# Patient Record
Sex: MALE | Race: OTHER | HISPANIC OR LATINO | URBAN - METROPOLITAN AREA
[De-identification: names, ages, dates, MRNs, and addresses within clinical notes are randomized per-mention and may not be internally consistent; named-entity substitution may affect disease eponyms.]

---

## 2018-04-25 ENCOUNTER — EMERGENCY (EMERGENCY)
Facility: HOSPITAL | Age: 38
LOS: 1 days | Discharge: HOME | End: 2018-04-25
Attending: EMERGENCY MEDICINE
Payer: MEDICAID

## 2018-04-25 VITALS
SYSTOLIC BLOOD PRESSURE: 122 MMHG | TEMPERATURE: 98 F | HEART RATE: 78 BPM | RESPIRATION RATE: 20 BRPM | DIASTOLIC BLOOD PRESSURE: 74 MMHG | OXYGEN SATURATION: 100 %

## 2018-04-25 VITALS
RESPIRATION RATE: 18 BRPM | TEMPERATURE: 98 F | OXYGEN SATURATION: 99 % | DIASTOLIC BLOOD PRESSURE: 62 MMHG | SYSTOLIC BLOOD PRESSURE: 105 MMHG | HEART RATE: 55 BPM

## 2018-04-25 PROCEDURE — 99284 EMERGENCY DEPT VISIT MOD MDM: CPT | Mod: 25

## 2018-04-25 PROCEDURE — 96375 TX/PRO/DX INJ NEW DRUG ADDON: CPT

## 2018-04-25 PROCEDURE — 96374 THER/PROPH/DIAG INJ IV PUSH: CPT

## 2018-04-25 RX ORDER — FAMOTIDINE 10 MG/ML
1 INJECTION INTRAVENOUS
Qty: 10 | Refills: 0 | OUTPATIENT
Start: 2018-04-25 | End: 2018-05-04

## 2018-04-25 RX ORDER — DIPHENHYDRAMINE HCL 50 MG
25 CAPSULE ORAL ONCE
Qty: 0 | Refills: 0 | Status: COMPLETED | OUTPATIENT
Start: 2018-04-25 | End: 2018-04-25

## 2018-04-25 RX ORDER — FAMOTIDINE 10 MG/ML
20 INJECTION INTRAVENOUS ONCE
Qty: 0 | Refills: 0 | Status: COMPLETED | OUTPATIENT
Start: 2018-04-25 | End: 2018-04-25

## 2018-04-25 RX ORDER — SODIUM CHLORIDE 9 MG/ML
3 INJECTION INTRAMUSCULAR; INTRAVENOUS; SUBCUTANEOUS ONCE
Qty: 0 | Refills: 0 | Status: COMPLETED | OUTPATIENT
Start: 2018-04-25 | End: 2018-04-25

## 2018-04-25 RX ORDER — EPINEPHRINE 0.3 MG/.3ML
1 INJECTION INTRAMUSCULAR; SUBCUTANEOUS
Qty: 1 | Refills: 0 | OUTPATIENT
Start: 2018-04-25

## 2018-04-25 RX ADMIN — Medication 25 MILLIGRAM(S): at 02:08

## 2018-04-25 RX ADMIN — FAMOTIDINE 20 MILLIGRAM(S): 10 INJECTION INTRAVENOUS at 02:08

## 2018-04-25 RX ADMIN — Medication 125 MILLIGRAM(S): at 02:08

## 2018-04-25 RX ADMIN — SODIUM CHLORIDE 3 MILLILITER(S): 9 INJECTION INTRAMUSCULAR; INTRAVENOUS; SUBCUTANEOUS at 02:10

## 2018-04-25 NOTE — ED PROVIDER NOTE - ENMT, MLM
Airway patent, no drooling. Nasal mucosa clear. Mouth with normal mucosa. Throat has no vesicles, no oropharyngeal exudates and uvula is midline. No swelling of posterior pharynx

## 2018-04-25 NOTE — ED PROVIDER NOTE - CONSTITUTIONAL, MLM
normal... Well appearing, well nourished, awake, alert, oriented to person, place, time/situation and in no apparent distress. Pt is speaking in full sentences

## 2018-04-25 NOTE — ED PROVIDER NOTE - OBJECTIVE STATEMENT
37 y/o M presents to the ED after allergic reaction which onset 4 hours ago. Pt states that he ordered a shake at the gym and started experiencing symotoms after drinking the shake, Pt did not eat any almonds but thinks that the shake may have had almonds in it. Pt did not eat or buy anything new besides the milkshake. Pt has difficulty breathing. Pt 39 y/o M presents to the ED after rashes and redness to arms & neck s/p allergic reaction which onset 4 hours ago. Pt states that he ordered a shake at the gym and started experiencing symptoms after drinking the shake. He says he did not eat any almonds but thinks that the shake may have had almonds in it. Pt did not eat or buy anything new besides the shake. He currently has difficulty breathing. No further complaints at this time.

## 2018-04-25 NOTE — ED PROVIDER NOTE - PROGRESS NOTE DETAILS
Pt improved with meds and is stable for d/c.  Rx Prednisone, Pepcid, Benadry and Epi-pen with f/u next 1-2 days

## 2018-04-25 NOTE — ED ADULT NURSE NOTE - OBJECTIVE STATEMENT
Pt a&ox3 c/o allergic rxn, pt reports being exposed to almonds @ his gym @ approx 2200 last night, pt took 25mg benadryl PTA. Pt c/o generalized itchiness and redness to body, redness noted to BUE and BLE, pt reports itchiness in throat and difficulty swallowing, pt placed on tele box, able to talk to RN. Lungs CTA HEATHER, RR even and unlabored,, SAo2 98% on room air, no angioedema noted. MAEx4, #20g iv placed to right ac, medicated per md orders, in no apparent distress @ this time, updated on poc safety maintained

## 2018-09-13 NOTE — ED ADULT NURSE NOTE - NS ED NURSE DC INFO COMPLEXITY
Left message for pt to return call to clinic regarding below information    HM and Surg Hx updated.   Simple: Patient demonstrates quick and easy understanding

## 2021-05-10 ENCOUNTER — EMERGENCY (EMERGENCY)
Facility: HOSPITAL | Age: 41
LOS: 1 days | Discharge: HOME | End: 2021-05-10
Attending: EMERGENCY MEDICINE
Payer: MEDICAID

## 2021-05-10 VITALS
SYSTOLIC BLOOD PRESSURE: 116 MMHG | HEART RATE: 77 BPM | DIASTOLIC BLOOD PRESSURE: 73 MMHG | RESPIRATION RATE: 18 BRPM | WEIGHT: 205.03 LBS | TEMPERATURE: 98 F | OXYGEN SATURATION: 97 % | HEIGHT: 69 IN

## 2021-05-10 PROCEDURE — 99283 EMERGENCY DEPT VISIT LOW MDM: CPT

## 2021-05-10 PROCEDURE — 73590 X-RAY EXAM OF LOWER LEG: CPT | Mod: 26,LT

## 2021-05-10 PROCEDURE — 99283 EMERGENCY DEPT VISIT LOW MDM: CPT | Mod: 25

## 2021-05-10 PROCEDURE — 73590 X-RAY EXAM OF LOWER LEG: CPT

## 2021-05-10 RX ORDER — CEPHALEXIN 500 MG
1 CAPSULE ORAL
Qty: 28 | Refills: 0
Start: 2021-05-10 | End: 2021-05-16

## 2021-05-10 NOTE — ED ADULT TRIAGE NOTE - CHIEF COMPLAINT QUOTE
Pt c/o left lower shin pain starting yesterday, states he can walk but painful to put weight on leg, denies injury/trauma to area but lifted a door the other day

## 2021-05-10 NOTE — ED STATDOCS - PATIENT PORTAL LINK FT
You can access the FollowMyHealth Patient Portal offered by [facility] by registering at the following website: [email or web address]. By joining [employer]’s FollowMyHealth portal, you will also be able to view your health information using other applications (apps) compatible with our system.

## 2021-05-10 NOTE — ED STATDOCS - OBJECTIVE STATEMENT
Pt is a 42 y/o M w/no reported PMHx presents c/o left lower leg pain.  Pt states that last night he began to have sharp 8/10 non-radiating pain over the anterior left lower leg.  He took two tylenol with some relief, but when he woke up this morning the pain had returned with weight bearing and movement.  At rest he states the pain is minimal.  Pt is very active at work, and carries heavy items, but does not recall an inciting event.

## 2021-05-10 NOTE — ED STATDOCS - NS ED ROS FT
CONSTITUTIONAL: No fevers, no chills  Eyes: No vision changes  Cardiovascular: No Chest pain  Respiratory: No SOB  Gastrointestinal: No n/v/c/d, no abd pain  Genitourinary: no dysuria, no hematuria  SKIN: no rashes.  MSK: +left leg pain  NEURO: no headache, no weakness, no numbness  PSYCHIATRIC: no SI/HI

## 2021-05-10 NOTE — ED STATDOCS - PHYSICAL EXAMINATION
General: well appearing, interactive, well nourished, NAD  HEENT: pupils equal and reactive, normal external ears bilaterally   Cardiac: RRR, no MRG appreciated  Resp: lungs clear to auscultation bilaterally, symmetric chest wall rise  Abd: soft, nontender, nondistended,   : no CVA tenderness  Neuro: Moving all extremities  Skin:  normal color for race  MSK: +ttp left lower shin, FROM, no erythema, no warmth, DP intact, no edema

## 2021-05-10 NOTE — ED STATDOCS - ATTENDING CONTRIBUTION TO CARE
Dr. Rodriguez : I have personally seen and examined this patient at the bedside. I have fully participated in the care of this patient. I have reviewed all pertinent clinical information, including history, physical exam, plan and the Resident's note and agree except as noted.     42 y/o M w/no reported PMHx presents c/o left lower leg pain since last night.   sharp 8/10 non-radiating pain over the anterior left lower leg.  He took two tylenol with some relief, but when he woke up this morning the pain had returned with weight bearing and movement.  no pain at rest.denies trauma no new gym routine.     Denies f/c/n/v/cp/sob/palpitations/cough/abd.pain/d/c/dysuria/hematuria.  no sick contacts/recent travel.    PE:  head; atraumatic normocephalic  eyes: perrla  Heart: rrr s1s2  lungs: ctab  abd: soft, nt nd + bs no rebound/guarding no cva ttp  le: no calf swelling no calf ttp mild swelling to anterior shin of left leg no erythema+warmth  no bullae no crepitus neurovascularly intact ambulatory   back: no midline cervical/thoracic/lumbar ttp    -->early cellulitis vs inflammatory c hanges xray abx strict return precautions discussed with pt and wife dc      -->

## 2021-05-10 NOTE — ED STATDOCS - CLINICAL SUMMARY MEDICAL DECISION MAKING FREE TEXT BOX
Pt is a 40 y/o M c/o left shin pain will recommend motrin for pain control, rest, ice, elevation and return precautions

## 2024-08-26 ENCOUNTER — EMERGENCY (EMERGENCY)
Facility: HOSPITAL | Age: 44
LOS: 1 days | Discharge: HOME | End: 2024-08-26
Attending: EMERGENCY MEDICINE
Payer: COMMERCIAL

## 2024-08-26 VITALS
WEIGHT: 195.11 LBS | OXYGEN SATURATION: 95 % | SYSTOLIC BLOOD PRESSURE: 136 MMHG | RESPIRATION RATE: 18 BRPM | TEMPERATURE: 99 F | HEIGHT: 68 IN | DIASTOLIC BLOOD PRESSURE: 89 MMHG | HEART RATE: 57 BPM

## 2024-08-26 PROCEDURE — 99283 EMERGENCY DEPT VISIT LOW MDM: CPT

## 2024-08-26 PROCEDURE — 96372 THER/PROPH/DIAG INJ SC/IM: CPT

## 2024-08-26 PROCEDURE — 99284 EMERGENCY DEPT VISIT MOD MDM: CPT

## 2024-08-26 RX ORDER — METHOCARBAMOL 750 MG/1
2 TABLET, FILM COATED ORAL
Qty: 18 | Refills: 0
Start: 2024-08-26 | End: 2024-08-28

## 2024-08-26 RX ORDER — KETOROLAC TROMETHAMINE 30 MG/ML
15 INJECTION, SOLUTION INTRAMUSCULAR ONCE
Refills: 0 | Status: DISCONTINUED | OUTPATIENT
Start: 2024-08-26 | End: 2024-08-26

## 2024-08-26 RX ORDER — LIDOCAINE/BENZALKONIUM/ALCOHOL
1 SOLUTION, NON-ORAL TOPICAL ONCE
Refills: 0 | Status: COMPLETED | OUTPATIENT
Start: 2024-08-26 | End: 2024-08-26

## 2024-08-26 RX ORDER — IBUPROFEN 600 MG
1 TABLET ORAL
Qty: 28 | Refills: 0
Start: 2024-08-26 | End: 2024-09-01

## 2024-08-26 RX ORDER — LIDOCAINE/BENZALKONIUM/ALCOHOL
1 SOLUTION, NON-ORAL TOPICAL
Qty: 1 | Refills: 0
Start: 2024-08-26 | End: 2024-08-30

## 2024-08-26 RX ORDER — METHOCARBAMOL 750 MG/1
1500 TABLET, FILM COATED ORAL ONCE
Refills: 0 | Status: COMPLETED | OUTPATIENT
Start: 2024-08-26 | End: 2024-08-26

## 2024-08-26 RX ADMIN — KETOROLAC TROMETHAMINE 15 MILLIGRAM(S): 30 INJECTION, SOLUTION INTRAMUSCULAR at 11:54

## 2024-08-26 RX ADMIN — METHOCARBAMOL 1500 MILLIGRAM(S): 750 TABLET, FILM COATED ORAL at 11:54

## 2024-08-26 RX ADMIN — Medication 1 PATCH: at 12:07

## 2024-08-26 RX ADMIN — KETOROLAC TROMETHAMINE 15 MILLIGRAM(S): 30 INJECTION, SOLUTION INTRAMUSCULAR at 13:08

## 2024-08-26 NOTE — ED PROVIDER NOTE - PATIENT PORTAL LINK FT
You can access the FollowMyHealth Patient Portal offered by [facility] by registering at the following website: [email or web address]. By joining [employer]’s FollowMyHealth portal, you will also be able to view your health information using other applications (apps) compatible with our system.

## 2024-08-26 NOTE — ED ADULT NURSE NOTE - OBJECTIVE STATEMENT
Assumed care of patient in rw. Pt a&ox4 rr even and unlabored presents to ed with c/o of lower back pain. Reports playing racket ball last week. Denies urinary symptoms, chest pain, sob, fevers, chills, gi symptoms. pt educated on plan of care, pt able to successfully teach back plan of care to RN, RN will continue to reeducate pt during hospital stay.

## 2024-08-26 NOTE — ED PROVIDER NOTE - OBJECTIVE STATEMENT
44M presenting to the ED c/o b/l lower back pain x 5 days. Pt states that he was playing [employer] ball earlier in the week and began experiencing the pain shortly thereafter. Pt otherwise denies recent injury/trauma, fever/chills, c/p, sob, abd pain, n/v/c/d, dysuria, numbness/tingling/weakness, saddle anesthesia, urinary/bowel dysfunction and has no other complaints at this time.

## 2024-08-26 NOTE — ED PROVIDER NOTE - CLINICAL SUMMARY MEDICAL DECISION MAKING FREE TEXT BOX
44M presenting to the ED c/o b/l lower back pain x 5 days. Pt states that he was playing pickle ball earlier in the week and began experiencing the pain shortly thereafter. Pts symptoms consistent with muscle strain vs spasm. Pt given meds with moderate relief, able to stand and ambulate in the ED without difficulty. STable for d/c with supportive care.

## 2024-08-26 NOTE — ED PROVIDER NOTE - ED STEMI HIDDEN
Ventricular Rate : 94  Atrial Rate : 94  P-R Interval : 188  QRS Duration : 84  Q-T Interval : 370  QTC Calculation(Bezet) : 462  P Axis : 65  R Axis : 48  T Axis : 65  Diagnosis : Sinus rhythm with Premature atrial complexes  Otherwise normal ECG  When compared with ECG of 02-AUG-2017 21:46,  Premature atrial complexes are now Present  Confirmed by DO CATES C ([id number]) on 4/23/2019 7:42:04 AM   hide